# Patient Record
Sex: MALE | URBAN - METROPOLITAN AREA
[De-identification: names, ages, dates, MRNs, and addresses within clinical notes are randomized per-mention and may not be internally consistent; named-entity substitution may affect disease eponyms.]

---

## 2024-11-20 ENCOUNTER — OFFICE VISIT (OUTPATIENT)
Dept: UROLOGY | Facility: CLINIC | Age: 64
End: 2024-11-20
Payer: COMMERCIAL

## 2024-11-20 VITALS
DIASTOLIC BLOOD PRESSURE: 84 MMHG | HEIGHT: 71 IN | WEIGHT: 232 LBS | HEART RATE: 66 BPM | OXYGEN SATURATION: 96 % | BODY MASS INDEX: 32.48 KG/M2 | SYSTOLIC BLOOD PRESSURE: 122 MMHG

## 2024-11-20 DIAGNOSIS — Z12.5 SCREENING FOR PROSTATE CANCER: ICD-10-CM

## 2024-11-20 DIAGNOSIS — N48.6 PEYRONIE DISEASE: ICD-10-CM

## 2024-11-20 DIAGNOSIS — N52.01 ERECTILE DYSFUNCTION DUE TO ARTERIAL INSUFFICIENCY: Primary | ICD-10-CM

## 2024-11-20 PROCEDURE — 99204 OFFICE O/P NEW MOD 45 MIN: CPT | Performed by: UROLOGY

## 2024-11-20 RX ORDER — ERGOCALCIFEROL (VITAMIN D2) 10 MCG
1000 TABLET ORAL
COMMUNITY

## 2024-11-20 RX ORDER — LISINOPRIL 20 MG/1
20 TABLET ORAL DAILY
COMMUNITY
Start: 2016-11-17

## 2024-11-20 RX ORDER — ATORVASTATIN CALCIUM 40 MG/1
40 TABLET, FILM COATED ORAL DAILY
COMMUNITY

## 2024-11-20 RX ORDER — TAMSULOSIN HYDROCHLORIDE 0.4 MG/1
0.4 CAPSULE ORAL EVERY EVENING
COMMUNITY
Start: 2024-09-21

## 2024-11-20 NOTE — PROGRESS NOTES
Name: Dexter Lopez      : 1960      MRN: [id number]  Encounter Provider: Michael Hernandez MD  Encounter Date: 2024   Encounter department: [facility] UROLOGY [place] [facility]  :  Assessment & Plan  Erectile dysfunction due to arterial insufficiency         Peyronie disease         Screening for prostate cancer       Impression: Mild erectile dysfunction, mild David's disease, routine prostate cancer screening    Plan: Provide the patient with reassurance that digital rectal examination and PSA are within normal limits.  With regards to his erectile dysfunction he is not interested in PDE 5 inhibitors.  I have prescribed low strength Trimix.  Prescription was sent to [facility] pharmacy.  Trimix teaching appointment was arranged with the advanced practitioner.  Conservative management recommended for the mild Peyronie's disease as he appears to be transitioning out of the acute phase.    Impression: Mild erectile dysfunction,  History of Present Illness   Dexter Lopez is a 64 y.o. male who presents as a new patient for evaluation of erectile dysfunction.  He states that his ED has been present for at least a year.  He also states that over the course of the last year he occasionally has pain with erections and has some superior deviation of the penis with erections.  He denies any specific traumatic event with intercourse.  He was seen at [facility] and diagnosed with mild Peyronie's disease along with erectile dysfunction.  He states that he has a prescription for tamsulosin but he is not using it.  He states he is voiding well without lower urinary tract symptoms.  He denies any nocturia.  He states that his father and uncle both had a history of prostate cancer.  He states for his erectile dysfunction he has tried Cialis as well as Viagra.  Both have given him significant headaches and flushing and he cannot tolerate PDE 5 inhibitors.  He was given a prescription for  intracavernosal Trimix at [facility] but he never was instructed on proper use or teaching and has not tried the medication    In February 2024 the patient had PSA of 0.53        AUA SYMPTOM SCORE      Flowsheet Row Most Recent Value   AUA SYMPTOM SCORE    How often have you had a sensation of not emptying your bladder completely after you finished urinating? 0 (P)     How often have you had to urinate again less than two hours after you finished urinating? 0 (P)     How often have you found you stopped and started again several times when you urinate? 1 (P)     How often have you found it difficult to postpone urination? 0 (P)     How often have you had a weak urinary stream? 0 (P)     How often have you had to push or strain to begin urination? 0 (P)     How many times did you most typically get up to urinate from the time you went to bed at night until the time you got up in the morning? 1 (P)     Quality of Life: If you were to spend the rest of your life with your urinary condition just the way it is now, how would you feel about that? 1 (P)     AUA SYMPTOM SCORE 2 (P)            Review of Systems  Past Medical History   Past Medical History:   Diagnosis Date    High cholesterol     Hypertension      Past Surgical History:   Procedure Laterality Date    GALLBLADDER SURGERY  10/2023    NOSE SURGERY      SHOULDER SURGERY Bilateral     WRIST SURGERY       Family History   Problem Relation Age of Onset    Diabetes Father     Prostate cancer Father     Cancer Mother     Diabetes Mother       reports that he has never smoked. He has never used smokeless tobacco. He reports current alcohol use. He reports that he does not use drugs.  Current Outpatient Medications on File Prior to Visit   Medication Sig Dispense Refill    adalimumab (HUMIRA) 40 mg/0.8 mL PSKT Inject 40 mg under the skin      atorvastatin (LIPITOR) 40 mg tablet Take 40 mg by mouth daily      lisinopril (ZESTRIL) 20 mg tablet Take 20 mg by  "mouth daily      Vitamin D, Cholecalciferol, 10 MCG (400 UNIT) TABS Take 1,000 Units by mouth      tamsulosin (FLOMAX) 0.4 mg Take 0.4 mg by mouth every evening (Patient not taking: Reported on 11/20/2024)       No current facility-administered medications on file prior to visit.     Allergies   Allergen Reactions    Other Angioedema     Fresh or raw fruits (uncooked) Apples especially throat starts closing    Aspirin Abdominal Pain, GI Intolerance and Other (See Comments)     Other reaction(s): Indigestion/GI Upset    Lactose - Food Allergy Other (See Comments)     Abdominal gas and bloating           Objective   /84 (BP Location: Left arm, Patient Position: Sitting, Cuff Size: Large)   Pulse 66   Ht 5' 11\" (1.803 m)   Wt 105 kg (232 lb)   SpO2 96%   BMI 32.36 kg/m²     Physical Exam on examination he is in no acute distress.  His abdomen is soft nontender nondistended.  There are no inguinal hernias.  Phallus is uncircumcised with a very small plaque appreciated at the base in the dorsal aspect.  Scrotum and scrotal contents are normal.  Testes are normal and properly descended in the scrotum.  Digital rectal examination reveals a benign 40 g prostate.  Skin is warm.  Extremities without edema.  Neurologic is grossly intact and nonfocal.  Gait normal.  Affect normal.    Results  No results found for: \"PSA\"  Lab Results   Component Value Date    CALCIUM 9.4 02/12/2024    K 4.1 02/12/2024    CO2 26 02/12/2024     02/12/2024    BUN 15 02/12/2024    CREATININE 1.00 02/12/2024     No results found for: \"WBC\", \"HGB\", \"HCT\", \"MCV\", \"PLT\"    Office Urine Dip  No results found for this or any previous visit (from the past hour).]      "

## 2024-12-16 NOTE — PROGRESS NOTES
"12/17/2024  Dexter Lopez  1960  [id number]      Assessment/Plan  Erectile dysfunction  Initiation of Tri Mix [5/0.5/15] injections at 0.1 mL dose  Wasn't interested in PDE5 inhibitors- had side effects   Plan to continue with 0.1 ml and titrate as needed   Injection hand out provided  Reviewed today   Plan to follow up in 6 months    Titration plan reviewed with patient- will perform an injection every 3 to 4 dadys to start, increasing dose in increments of 0.1ml until reaching the lowest effective dose and concentration. He knows to notify my office if any questions concerns during the titration process, and also to alert us to his final effective dose, need for refills, or concentration adjustment.    Discussion  Dexter Lopez is a 64 y.o. being managed by Dr. Hernandez He was provided verbal, written, and physical demonstration of intracavernosal injection use. He was instructed on storage, disposal, and titration of the medication. He was instructed on hospital precautions for a priapism and use of home pseudoephedrine.  He is instructed not to use more than 2-3 doses per week, alternate left/right injection sites with each use to prevent scarring. He was able to demonstrate understanding of injection use. All questions were answered.    Patient with NO history of sickle cell trait/disease, thrombocytopenia, cocaine or trazodone use. Has trialed various PDE5i either without sufficient benefit or with adverse side effects for which local therapy has been recommended instead.    History of Present Illness  55 y.o. male with ED presents today for intracavernosal injection teaching.    /82 (BP Location: Left arm, Patient Position: Sitting, Cuff Size: Adult)   Pulse 71   Ht 5' 11\" (1.803 m)   Wt 106 kg (233 lb)   SpO2 98%   BMI 32.50 kg/m²       Procedure    Procedure: intracavernosal injection  Indication: Erectile Rehabilitation  unknown cause  Discussed:. Proper technique and instruction for " intracavernosal injection were explained to the patient. risks of injection including but not limited to pain, bleeding, infection, fibrosis, and priapism (including the potential complications of priapism) were discussed.   Procedure: The skin overlying the injection site RIGHT side was then prepped with Alcohol. 0.1 ml of medication was injected into the corpora cavernosum.   Patient Status:. the patient was closely monitored for 10 minutes and reassessed. He tolerated the procedure well. Response to intracavernosal injection was good  Complications:. No complications noted.   Instructions:. the patient was counseled about priapism and instructed to return to the clinic or the emergency room if his erection lasted up to 4 hours. The patient expressed understanding of the injection technique and felt able to perform self-injection.         Injection corpora cavernosa     Date/Time  12/17/2024 11:00 AM     Performed by  SOO Rg   Authorized by  SOO Rg     Universal Protocol   procedure performed by consultantConsent: Verbal consent obtained.  Consent given by: patient  Patient understanding: patient states understanding of the procedure being performed      Local anesthesia used: no     Anesthesia   Local anesthesia used: no     Sedation   Patient sedated: no       Procedure Details   Procedure Notes: Injected without difficulty.

## 2024-12-17 ENCOUNTER — PROCEDURE VISIT (OUTPATIENT)
Dept: UROLOGY | Facility: CLINIC | Age: 64
End: 2024-12-17
Payer: COMMERCIAL

## 2024-12-17 VITALS
SYSTOLIC BLOOD PRESSURE: 138 MMHG | HEIGHT: 71 IN | DIASTOLIC BLOOD PRESSURE: 82 MMHG | BODY MASS INDEX: 32.62 KG/M2 | OXYGEN SATURATION: 98 % | WEIGHT: 233 LBS | HEART RATE: 71 BPM

## 2024-12-17 DIAGNOSIS — N52.01 ERECTILE DYSFUNCTION DUE TO ARTERIAL INSUFFICIENCY: Primary | ICD-10-CM

## 2024-12-17 PROCEDURE — 99213 OFFICE O/P EST LOW 20 MIN: CPT

## 2024-12-17 RX ORDER — MULTIVIT WITH MINERALS/LUTEIN
1000 TABLET ORAL DAILY
COMMUNITY

## 2025-07-15 ENCOUNTER — VBI (OUTPATIENT)
Dept: ADMINISTRATIVE | Facility: OTHER | Age: 65
End: 2025-07-15

## 2025-07-15 ENCOUNTER — OFFICE VISIT (OUTPATIENT)
Dept: UROLOGY | Facility: CLINIC | Age: 65
End: 2025-07-15
Payer: COMMERCIAL

## 2025-07-15 VITALS
DIASTOLIC BLOOD PRESSURE: 86 MMHG | BODY MASS INDEX: 32.2 KG/M2 | SYSTOLIC BLOOD PRESSURE: 120 MMHG | WEIGHT: 230 LBS | OXYGEN SATURATION: 97 % | HEIGHT: 71 IN | HEART RATE: 78 BPM

## 2025-07-15 DIAGNOSIS — N52.01 ERECTILE DYSFUNCTION DUE TO ARTERIAL INSUFFICIENCY: Primary | ICD-10-CM

## 2025-07-15 PROCEDURE — 99213 OFFICE O/P EST LOW 20 MIN: CPT
